# Patient Record
Sex: FEMALE | Race: WHITE | Employment: UNEMPLOYED
[De-identification: names, ages, dates, MRNs, and addresses within clinical notes are randomized per-mention and may not be internally consistent; named-entity substitution may affect disease eponyms.]

---

## 2023-10-17 ENCOUNTER — HOSPITAL ENCOUNTER (OUTPATIENT)
Facility: HOSPITAL | Age: 23
Setting detail: SPECIMEN
Discharge: HOME | End: 2023-10-20
Payer: OTHER GOVERNMENT

## 2023-10-17 ENCOUNTER — APPOINTMENT (OUTPATIENT)
Age: 23
End: 2023-10-17
Payer: OTHER GOVERNMENT

## 2023-10-17 DIAGNOSIS — R79.89 ELEVATED TSH: ICD-10-CM

## 2023-10-17 LAB
T4 FREE SERPL-MCNC: 1.1 NG/DL (ref 0.7–1.5)
TSH SERPL DL<=0.05 MIU/L-ACNC: 4.13 UIU/ML (ref 0.36–3.74)

## 2023-10-17 PROCEDURE — 84480 ASSAY TRIIODOTHYRONINE (T3): CPT

## 2023-10-17 PROCEDURE — 36415 COLL VENOUS BLD VENIPUNCTURE: CPT

## 2023-10-17 PROCEDURE — 86800 THYROGLOBULIN ANTIBODY: CPT

## 2023-10-17 PROCEDURE — 84439 ASSAY OF FREE THYROXINE: CPT

## 2023-10-17 PROCEDURE — 86376 MICROSOMAL ANTIBODY EACH: CPT

## 2023-10-17 PROCEDURE — 84443 ASSAY THYROID STIM HORMONE: CPT

## 2023-10-20 LAB
T3 SERPL-MCNC: 151 NG/DL (ref 71–180)
THYROGLOB AB SERPL-ACNC: <1 IU/ML (ref 0–0.9)
THYROPEROXIDASE AB SERPL-ACNC: <9 IU/ML (ref 0–34)

## 2024-01-22 ENCOUNTER — NURSE ONLY (OUTPATIENT)
Age: 24
End: 2024-01-22
Payer: OTHER GOVERNMENT

## 2024-01-22 PROCEDURE — 90739 HEPB VACC 2/4 DOSE ADULT IM: CPT | Performed by: NURSE PRACTITIONER

## 2024-01-22 PROCEDURE — 90651 9VHPV VACCINE 2/3 DOSE IM: CPT | Performed by: NURSE PRACTITIONER

## 2024-01-22 PROCEDURE — 90716 VAR VACCINE LIVE SUBQ: CPT | Performed by: NURSE PRACTITIONER

## 2024-01-22 PROCEDURE — PBSHW HEP B, HEPLISAV-B, (AGE 18 YRS+), IM, 0.5ML, 2-DOSE: Performed by: NURSE PRACTITIONER

## 2024-01-22 PROCEDURE — PBSHW VARICELLA, VARIVAX, (AGE 12 MO+), SC: Performed by: NURSE PRACTITIONER

## 2024-01-22 PROCEDURE — PBSHW HPV, GARDASIL 9, (AGE 9-45 YRS), IM: Performed by: NURSE PRACTITIONER

## 2024-01-22 NOTE — PROGRESS NOTES
Niurka Padgett denies any symptoms , reactions or allergies that would exclude them from being immunized today. Risks and adverse reactions were discussed and the most current VIS was given to them along with consent signed. All questions were addressed. Patient received Gardasil 9, Heplisav, and Varicella. Tolerated well. No signs or symptoms of distress noted. Patient left office ambulatory.

## 2024-02-06 ENCOUNTER — HOSPITAL ENCOUNTER (OUTPATIENT)
Facility: HOSPITAL | Age: 24
Setting detail: SPECIMEN
Discharge: HOME | End: 2024-02-09
Payer: OTHER GOVERNMENT

## 2024-02-06 ENCOUNTER — OFFICE VISIT (OUTPATIENT)
Age: 24
End: 2024-02-06
Payer: OTHER GOVERNMENT

## 2024-02-06 VITALS
DIASTOLIC BLOOD PRESSURE: 65 MMHG | BODY MASS INDEX: 44.41 KG/M2 | SYSTOLIC BLOOD PRESSURE: 112 MMHG | HEIGHT: 68 IN | HEART RATE: 92 BPM | TEMPERATURE: 98.2 F | WEIGHT: 293 LBS | OXYGEN SATURATION: 100 % | RESPIRATION RATE: 20 BRPM

## 2024-02-06 DIAGNOSIS — Z30.09 FAMILY PLANNING: ICD-10-CM

## 2024-02-06 DIAGNOSIS — Z00.00 ANNUAL PHYSICAL EXAM: ICD-10-CM

## 2024-02-06 DIAGNOSIS — Z00.00 ENCOUNTER FOR WELL ADULT EXAM WITHOUT ABNORMAL FINDINGS: ICD-10-CM

## 2024-02-06 DIAGNOSIS — L81.9 ATYPICAL PIGMENTED LESION: ICD-10-CM

## 2024-02-06 DIAGNOSIS — Z00.00 ANNUAL PHYSICAL EXAM: Primary | ICD-10-CM

## 2024-02-06 DIAGNOSIS — Z87.42 HISTORY OF PCOS: ICD-10-CM

## 2024-02-06 DIAGNOSIS — Z11.3 SCREENING EXAMINATION FOR STD (SEXUALLY TRANSMITTED DISEASE): ICD-10-CM

## 2024-02-06 DIAGNOSIS — Z23 IMMUNIZATION DUE: ICD-10-CM

## 2024-02-06 DIAGNOSIS — R21 SCALY PATCH RASH: ICD-10-CM

## 2024-02-06 DIAGNOSIS — Z12.4 CERVICAL CANCER SCREENING: ICD-10-CM

## 2024-02-06 LAB
ALBUMIN SERPL-MCNC: 3.9 G/DL (ref 3.4–5)
ALBUMIN/GLOB SERPL: 1 (ref 0.8–1.7)
ALP SERPL-CCNC: 67 U/L (ref 45–117)
ALT SERPL-CCNC: 29 U/L (ref 13–56)
ANION GAP SERPL CALC-SCNC: 3 MMOL/L (ref 3–18)
APPEARANCE UR: CLEAR
AST SERPL-CCNC: 19 U/L (ref 10–38)
BACTERIA URNS QL MICRO: ABNORMAL /HPF
BASOPHILS # BLD: 0 K/UL (ref 0–0.1)
BASOPHILS NFR BLD: 0 % (ref 0–2)
BILIRUB SERPL-MCNC: 0.4 MG/DL (ref 0.2–1)
BILIRUB UR QL: NEGATIVE
BUN SERPL-MCNC: 13 MG/DL (ref 7–18)
BUN/CREAT SERPL: 19 (ref 12–20)
CALCIUM SERPL-MCNC: 9.6 MG/DL (ref 8.5–10.1)
CHLORIDE SERPL-SCNC: 106 MMOL/L (ref 100–111)
CHOLEST SERPL-MCNC: 128 MG/DL
CO2 SERPL-SCNC: 29 MMOL/L (ref 21–32)
COLOR UR: YELLOW
CREAT SERPL-MCNC: 0.7 MG/DL (ref 0.6–1.3)
DIFFERENTIAL METHOD BLD: ABNORMAL
EOSINOPHIL # BLD: 0 K/UL (ref 0–0.4)
EOSINOPHIL NFR BLD: 1 % (ref 0–5)
EPITH CASTS URNS QL MICRO: ABNORMAL /LPF (ref 0–5)
ERYTHROCYTE [DISTWIDTH] IN BLOOD BY AUTOMATED COUNT: 14.8 % (ref 11.6–14.5)
EST. AVERAGE GLUCOSE BLD GHB EST-MCNC: 114 MG/DL
GLOBULIN SER CALC-MCNC: 3.8 G/DL (ref 2–4)
GLUCOSE SERPL-MCNC: 92 MG/DL (ref 74–99)
GLUCOSE UR STRIP.AUTO-MCNC: NEGATIVE MG/DL
HBA1C MFR BLD: 5.6 % (ref 4.2–5.6)
HCT VFR BLD AUTO: 38.3 % (ref 35–45)
HDLC SERPL-MCNC: 41 MG/DL (ref 40–60)
HDLC SERPL: 3.1 (ref 0–5)
HGB BLD-MCNC: 11.8 G/DL (ref 12–16)
HGB UR QL STRIP: NEGATIVE
IMM GRANULOCYTES # BLD AUTO: 0 K/UL (ref 0–0.04)
IMM GRANULOCYTES NFR BLD AUTO: 0 % (ref 0–0.5)
KETONES UR QL STRIP.AUTO: NEGATIVE MG/DL
LDLC SERPL CALC-MCNC: 70 MG/DL (ref 0–100)
LEUKOCYTE ESTERASE UR QL STRIP.AUTO: NEGATIVE
LIPID PANEL: NORMAL
LYMPHOCYTES # BLD: 2.3 K/UL (ref 0.9–3.6)
LYMPHOCYTES NFR BLD: 28 % (ref 21–52)
MCH RBC QN AUTO: 22.4 PG (ref 24–34)
MCHC RBC AUTO-ENTMCNC: 30.8 G/DL (ref 31–37)
MCV RBC AUTO: 72.8 FL (ref 78–100)
MONOCYTES # BLD: 0.6 K/UL (ref 0.05–1.2)
MONOCYTES NFR BLD: 7 % (ref 3–10)
NEUTS SEG # BLD: 5.2 K/UL (ref 1.8–8)
NEUTS SEG NFR BLD: 64 % (ref 40–73)
NITRITE UR QL STRIP.AUTO: NEGATIVE
NRBC # BLD: 0 K/UL (ref 0–0.01)
NRBC BLD-RTO: 0 PER 100 WBC
PH UR STRIP: 7.5 (ref 5–8)
PLATELET # BLD AUTO: 423 K/UL (ref 135–420)
PMV BLD AUTO: 9 FL (ref 9.2–11.8)
POTASSIUM SERPL-SCNC: 4 MMOL/L (ref 3.5–5.5)
PROT SERPL-MCNC: 7.7 G/DL (ref 6.4–8.2)
PROT UR STRIP-MCNC: NEGATIVE MG/DL
RBC # BLD AUTO: 5.26 M/UL (ref 4.2–5.3)
RBC #/AREA URNS HPF: NEGATIVE /HPF (ref 0–5)
SODIUM SERPL-SCNC: 138 MMOL/L (ref 136–145)
SP GR UR REFRACTOMETRY: 1.03 (ref 1–1.03)
TRIGL SERPL-MCNC: 85 MG/DL
TSH SERPL DL<=0.05 MIU/L-ACNC: 2.66 UIU/ML (ref 0.36–3.74)
UROBILINOGEN UR QL STRIP.AUTO: 1 EU/DL (ref 0.2–1)
VLDLC SERPL CALC-MCNC: 17 MG/DL
WBC # BLD AUTO: 8.2 K/UL (ref 4.6–13.2)
WBC URNS QL MICRO: NEGATIVE /HPF (ref 0–4)

## 2024-02-06 PROCEDURE — 87389 HIV-1 AG W/HIV-1&-2 AB AG IA: CPT

## 2024-02-06 PROCEDURE — 86803 HEPATITIS C AB TEST: CPT

## 2024-02-06 PROCEDURE — 87491 CHLMYD TRACH DNA AMP PROBE: CPT

## 2024-02-06 PROCEDURE — 90715 TDAP VACCINE 7 YRS/> IM: CPT | Performed by: NURSE PRACTITIONER

## 2024-02-06 PROCEDURE — 86780 TREPONEMA PALLIDUM: CPT

## 2024-02-06 PROCEDURE — 85025 COMPLETE CBC W/AUTO DIFF WBC: CPT

## 2024-02-06 PROCEDURE — 87591 N.GONORRHOEAE DNA AMP PROB: CPT

## 2024-02-06 PROCEDURE — 80053 COMPREHEN METABOLIC PANEL: CPT

## 2024-02-06 PROCEDURE — 83036 HEMOGLOBIN GLYCOSYLATED A1C: CPT

## 2024-02-06 PROCEDURE — 87661 TRICHOMONAS VAGINALIS AMPLIF: CPT

## 2024-02-06 PROCEDURE — 80061 LIPID PANEL: CPT

## 2024-02-06 PROCEDURE — 81001 URINALYSIS AUTO W/SCOPE: CPT

## 2024-02-06 PROCEDURE — 84443 ASSAY THYROID STIM HORMONE: CPT

## 2024-02-06 PROCEDURE — 99395 PREV VISIT EST AGE 18-39: CPT | Performed by: NURSE PRACTITIONER

## 2024-02-06 PROCEDURE — 36415 COLL VENOUS BLD VENIPUNCTURE: CPT

## 2024-02-06 PROCEDURE — 87350 HEPATITIS BE AG IA: CPT

## 2024-02-06 ASSESSMENT — PATIENT HEALTH QUESTIONNAIRE - PHQ9
SUM OF ALL RESPONSES TO PHQ QUESTIONS 1-9: 0
SUM OF ALL RESPONSES TO PHQ QUESTIONS 1-9: 0
SUM OF ALL RESPONSES TO PHQ9 QUESTIONS 1 & 2: 0
1. LITTLE INTEREST OR PLEASURE IN DOING THINGS: 0
SUM OF ALL RESPONSES TO PHQ QUESTIONS 1-9: 0
SUM OF ALL RESPONSES TO PHQ QUESTIONS 1-9: 0
2. FEELING DOWN, DEPRESSED OR HOPELESS: 0

## 2024-02-06 ASSESSMENT — ENCOUNTER SYMPTOMS
NAUSEA: 0
CHEST TIGHTNESS: 0
SHORTNESS OF BREATH: 0
ABDOMINAL PAIN: 0
COUGH: 0
VOMITING: 0

## 2024-02-06 NOTE — PROGRESS NOTES
1. \"Have you been to the ER, urgent care clinic since your last visit?  Hospitalized since your last visit?\" No    2. \"Have you seen or consulted any other health care providers outside of the [facility] since your last visit?\" No     If the patient is female:    3. For patients aged 21-65: Has the patient had a pap smear? Yes - no Care Gap present- 2023- normal results.

## 2024-02-06 NOTE — PROGRESS NOTES
Niurka Padgett (:  2000) is a 23 y.o. female, here for evaluation of the following medical concerns:  No chief complaint on file.        ASSESSMENT/PLAN:    {There are no diagnoses linked to this encounter. (Refresh or delete this SmartLink)}   No follow-ups on file.        HPI    Review of Systems    Prior to Visit Medications    Medication Sig Taking? Authorizing Provider   metFORMIN (GLUCOPHAGE) 500 MG tablet Take 1 tablet by mouth 2 times daily  Jose Luis Sinclair MD   Ferrous Sulfate (IRON PO) Take 1 tablet by mouth daily  Joes Luis Sinclair MD   OMEPRAZOLE PO Take 1 tablet by mouth daily  Jose Luis Sinclair MD        Social History     Tobacco Use    Smoking status: Former     Current packs/day: 0.25     Average packs/day: 0.3 packs/day for 2.0 years (0.5 ttl pk-yrs)     Types: Cigarettes, Cigars     Passive exposure: Never    Smokeless tobacco: Never   Substance Use Topics    Alcohol use: Never        There were no vitals taken for this visit.  Estimated body mass index is 44.38 kg/m² as calculated from the following:    Height as of 10/9/23: 1.715 m (5' 7.5\").    Weight as of 10/9/23: 130.5 kg (287 lb 9.6 oz).  Past Medical History:   Diagnosis Date    History of PCOS     Obesity      Past Surgical History:   Procedure Laterality Date    COSMETIC SURGERY      Breast reduction for back pain    UPPER GASTROINTESTINAL ENDOSCOPY      WISDOM TOOTH EXTRACTION  2023     Family History   Problem Relation Age of Onset    Depression Mother     High Blood Pressure Mother     Mental Illness Mother     Miscarriages / Stillbirths Mother     Obesity Mother     Substance Abuse Mother     Alcohol Abuse Father     Substance Abuse Maternal Grandfather     Asthma Maternal Grandmother     High Blood Pressure Maternal Grandmother     Psoriasis Maternal Grandmother     Substance Abuse Maternal Grandmother     Alcohol Abuse Paternal Grandfather     Obesity Sister     Substance Abuse Maternal Uncle

## 2024-02-06 NOTE — PROGRESS NOTES
Well Adult Note  Name: Niurka Padgett Today’s Date: 2024   MRN: [id number] Sex: Female   Age: 23 y.o. Ethnicity: Unavailable / Unknown   : 2000 Race: Black /   White (non-)      Niurka Padgett is here for well adult exam. Reports a \"spot\" left leg raised, no drainage, swelling,  pain-- ref to derm, brown/black annular lesion,       History:  She reports with hx of insomnia and noted positive depression screening, pcos with morbid obesity. Ref to psych and gyn, she is with irregular periods, last period was in .      PCOS- taking Metformin 500 mg with Dr. Wlison in [place]- has not been refilled so has not been taking. She has cut out red meats for reasons of inflammation states lighter and less sluggish, she is using carbonated grimaldo, has ice cream every now and then, eating pasta once in a while.      Acid reflux- omeprazole, had a GI provider in the past with bleeding ulcer. Was placed on iron supplements as well. States hx of iron def told with donating blood.      PHQ9= 19/27, reports hx of SI attempt age 16, was admitted. Mother with hx of drug addition, large family hx of drug addiction, she raised her sister so she is worrying because she had to leave her sister with the family.   -- she reports in a better place, talked with mother and reports putting things in the past    Review of Systems   Constitutional:  Negative for diaphoresis and fatigue.   Respiratory:  Negative for cough, chest tightness and shortness of breath.    Cardiovascular:  Negative for chest pain, palpitations and leg swelling.   Gastrointestinal:  Negative for abdominal pain, nausea and vomiting.   Neurological:  Negative for dizziness, weakness and headaches.       Allergies   Allergen Reactions    Acetaminophen Itching and Headaches         Prior to Visit Medications    Medication Sig Taking? Authorizing Provider   metFORMIN (GLUCOPHAGE) 500 MG tablet Take 1 tablet by mouth 2 times daily  Provider,  Referral To Dermatology  7. Atypical pigmented lesion  -     External Referral To Dermatology  8. History of PCOS  -     External Referral To Gynecology  9. Family planning  -     External Referral To Gynecology         Personalized Preventive Plan   Current Health Maintenance Status  Immunization History   Administered Date(s) Administered    HPV, GARDASIL 9, (age 9y-45y), IM, 0.5mL 01/22/2024    Hep B, HEPLISAV-B, (age 18y+), IM, 0.5mL 01/22/2024    Varicella, VARIVAX, (age 12m+), [place], 0.5mL 01/22/2024        Health Maintenance   Topic Date Due    COVID-19 Vaccine (1) Never done    Chlamydia/GC screen  Never done    Hepatitis C screen  Never done    DTaP/Tdap/Td vaccine (1 - Tdap) Never done    Pap smear  Never done    Flu vaccine (1) Never done    Hepatitis B vaccine (2 of 2 - CpG 2-dose series) 02/19/2024    HPV vaccine (2 - 3-dose series) 02/19/2024    Varicella vaccine (2 of 2 - 13+ 2-dose series) 02/19/2024    Depression Monitoring  10/09/2024    HIV screen  Completed    Hepatitis A vaccine  Aged Out    Hib vaccine  Aged Out    Polio vaccine  Aged Out    Meningococcal (ACWY) vaccine  Aged Out    Pneumococcal 0-64 years Vaccine  Aged Out    Depression Screen  Discontinued     Recommendations for Preventive Services Due: see orders and patient instructions/AVS.    Return in 1 year (on 2/6/2025).

## 2024-02-06 NOTE — PATIENT INSTRUCTIONS
energy. They keep your heart beating, your brain active, and your muscles working.  On most days, try to eat from each food group. This means eating a variety of:  Whole grains, such as whole wheat breads and pastas.  Fruits and vegetables.  Dairy products, such as low-fat milk, yogurt, and cheese.  Lean proteins, such as all types of fish, chicken without the skin, and beans.  Don't have too much or too little of one thing. All foods, if eaten in moderation, can be part of healthy eating. Even sweets can be okay.  If your favorite foods are high in fat, salt, sugar, or calories, limit how often you eat them. Eat smaller servings, or look for healthy substitutes.  Do watch what you eat  Many people eat more than their bodies need. Part of staying at a healthy weight means learning how much food you really need from day to day and not eating more than that. Even with healthy foods, eating too much can make you gain weight.  Having a well-balanced diet means that you eat enough, but not too much, and that your food gives you the nutrients you need to stay healthy. So listen to your body. Eat when you're hungry. Stop when you feel satisfied.  It's a good idea to have healthy snacks ready for when you get hungry. Keep healthy snacks with you at work, in your car, and at home. If you have a healthy snack easily available, you'll be less likely to pick a candy bar or bag of chips from a vending machine instead.  Some healthy snacks you might want to keep on hand are fruit, low-fat yogurt, string cheese, low-fat microwave popcorn, raisins and other dried fruit, nuts, whole wheat crackers, pretzels, carrots, celery sticks, and broccoli.  Do some physical activity  A big part of reaching and staying at a healthy weight is being active.  When you're active, you burn calories. This makes it easier to reach and stay at a healthy weight. When you're active on a regular basis, your body burns more calories, even when you're at  sugar   Take vitamin supplements, as recommended  It can take several months to learn to listen to your body so that you know when you are hungry and when you are full. You may dislike foods you previously loved, and you may begin to prefer new foods. This can be a frustrating process for some people, so talk to your dietitian if you are having trouble.  It usually takes between one and two years to lose weight after surgery. After reaching their goal weight, some people have plastic surgery (called \"body contouring\") to remove excess skin from the body, particularly in the abdominal area.  Before you decide to have weight loss surgery, you must commit to staying healthy for life. This includes following up with your healthcare team, exercising most days of the week, and eating a sensible diet every day. It can be difficult to develop new eating and exercise habits after weight loss surgery, and you will have to work hard to stick to your goals.  Recovering from surgery and losing weight can be stressful and emotional, and it is important to have the support of family and friends. Working with a , therapist, or support group can help you through the ups and downs.  WHERE TO GET MORE INFORMATION -- Your healthcare provider is the best source of information for questions and concerns related to your medical problem.  This article will be updated as needed every four months on our Web site ([email or web address])    High-Fiber Diet     What Is Fiber?   Dietary fiber is a form of carbohydrate found in plants that cannot be digested by humans. All plants contain fiber, including fruits, vegetables, grains, and legumes. Fiber is often classified into two categories: soluble and insoluble.   Soluble fiber draws water into the bowel and can help slow digestion. Examples of foods that are high in soluble fiber include oatmeal, oat bran, barley, legumes (eg, beans and peas), apples, and strawberries.

## 2024-02-07 LAB
HCV AB SER IA-ACNC: 0.06 INDEX
HCV AB SERPL QL IA: NEGATIVE
HEPATITIS C COMMENT: NORMAL
HIV 1+2 AB+HIV1 P24 AG SERPL QL IA: NONREACTIVE
HIV 1/2 RESULT COMMENT: NORMAL
T PALLIDUM AB SER QL IA: NONREACTIVE

## 2024-02-07 PROCEDURE — PBSHW TDAP, BOOSTRIX, (AGE 10 YRS+), IM: Performed by: NURSE PRACTITIONER

## 2024-02-07 PROCEDURE — 90471 IMMUNIZATION ADMIN: CPT | Performed by: NURSE PRACTITIONER

## 2024-02-08 LAB
C TRACH RRNA SPEC QL NAA+PROBE: NEGATIVE
N GONORRHOEA RRNA SPEC QL NAA+PROBE: NEGATIVE
SPECIMEN SOURCE: NORMAL
T VAGINALIS RRNA SPEC QL NAA+PROBE: NEGATIVE

## 2024-02-14 LAB — HBV E AG SERPL QL IA: NEGATIVE

## 2024-02-23 ENCOUNTER — NURSE ONLY (OUTPATIENT)
Age: 24
End: 2024-02-23
Payer: OTHER GOVERNMENT

## 2024-02-23 PROCEDURE — PBSHW HEP B, HEPLISAV-B, (AGE 18 YRS+), IM, 0.5ML, 2-DOSE: Performed by: NURSE PRACTITIONER

## 2024-02-23 PROCEDURE — 90739 HEPB VACC 2/4 DOSE ADULT IM: CPT | Performed by: NURSE PRACTITIONER

## 2024-02-23 NOTE — PROGRESS NOTES
Patient is here today for #2 hep b vaccine. Patient was given the consent form to fill out. Medications and allergies were reviewed, and patient was asked if she/he is on any steroids or antibiotics. Patient stated that she/he is not feeling sick and denied steroids/antibiotics today. Consent was singed and vaccine was given. Patient was instructed on when to go to the emergency room and what is normal after giving vaccines. Patient verbalized a understanding.

## 2024-02-28 ENCOUNTER — NURSE ONLY (OUTPATIENT)
Age: 24
End: 2024-02-28
Payer: OTHER GOVERNMENT

## 2024-02-28 DIAGNOSIS — Z23 IMMUNIZATION DUE: Primary | ICD-10-CM

## 2024-02-28 PROCEDURE — 90471 IMMUNIZATION ADMIN: CPT | Performed by: NURSE PRACTITIONER

## 2024-02-28 PROCEDURE — PBSHW HPV, GARDASIL 9, (AGE 9-45 YRS), IM: Performed by: NURSE PRACTITIONER

## 2024-02-28 NOTE — PROGRESS NOTES
Patient is here today for #2 hpv vaccine. Patient was given the consent form to fill out. Medications and allergies were reviewed, and patient was asked if she is on any steroids or antibiotics. Patient stated that she is not feeling sick and denied steroids/antibiotics today. Consent was singed and vaccine was given. Patient was instructed on when to go to the emergency room and what is normal after giving vaccines. Patient verbalized a understanding.

## 2024-07-10 ENCOUNTER — OFFICE VISIT (OUTPATIENT)
Facility: CLINIC | Age: 24
End: 2024-07-10
Payer: OTHER GOVERNMENT

## 2024-07-10 ENCOUNTER — HOSPITAL ENCOUNTER (OUTPATIENT)
Facility: HOSPITAL | Age: 24
Setting detail: SPECIMEN
Discharge: HOME | End: 2024-07-13
Payer: OTHER GOVERNMENT

## 2024-07-10 VITALS
TEMPERATURE: 98.1 F | DIASTOLIC BLOOD PRESSURE: 83 MMHG | SYSTOLIC BLOOD PRESSURE: 118 MMHG | HEIGHT: 67 IN | OXYGEN SATURATION: 97 % | BODY MASS INDEX: 45.71 KG/M2 | RESPIRATION RATE: 20 BRPM | WEIGHT: 291.2 LBS | HEART RATE: 92 BPM

## 2024-07-10 DIAGNOSIS — K13.70 MOUTH LESION: Primary | ICD-10-CM

## 2024-07-10 DIAGNOSIS — Z86.39 HISTORY OF IRON DEFICIENCY: ICD-10-CM

## 2024-07-10 DIAGNOSIS — K13.70 MOUTH LESION: ICD-10-CM

## 2024-07-10 LAB
BASOPHILS # BLD: 0 K/UL (ref 0–0.1)
BASOPHILS NFR BLD: 0 % (ref 0–2)
DIFFERENTIAL METHOD BLD: ABNORMAL
EOSINOPHIL # BLD: 0.1 K/UL (ref 0–0.4)
EOSINOPHIL NFR BLD: 2 % (ref 0–5)
ERYTHROCYTE [DISTWIDTH] IN BLOOD BY AUTOMATED COUNT: 15.5 % (ref 11.6–14.5)
FERRITIN SERPL-MCNC: 25 NG/ML (ref 8–388)
HCT VFR BLD AUTO: 34 % (ref 35–45)
HGB BLD-MCNC: 10.6 G/DL (ref 12–16)
IMM GRANULOCYTES # BLD AUTO: 0 K/UL (ref 0–0.04)
IMM GRANULOCYTES NFR BLD AUTO: 0 % (ref 0–0.5)
LYMPHOCYTES # BLD: 2.1 K/UL (ref 0.9–3.6)
LYMPHOCYTES NFR BLD: 29 % (ref 21–52)
MCH RBC QN AUTO: 22.6 PG (ref 24–34)
MCHC RBC AUTO-ENTMCNC: 31.2 G/DL (ref 31–37)
MCV RBC AUTO: 72.6 FL (ref 78–100)
MONOCYTES # BLD: 0.5 K/UL (ref 0.05–1.2)
MONOCYTES NFR BLD: 8 % (ref 3–10)
NEUTS SEG # BLD: 4.3 K/UL (ref 1.8–8)
NEUTS SEG NFR BLD: 61 % (ref 40–73)
NRBC # BLD: 0 K/UL (ref 0–0.01)
NRBC BLD-RTO: 0 PER 100 WBC
PLATELET # BLD AUTO: 436 K/UL (ref 135–420)
PMV BLD AUTO: 8.5 FL (ref 9.2–11.8)
RBC # BLD AUTO: 4.68 M/UL (ref 4.2–5.3)
WBC # BLD AUTO: 7.1 K/UL (ref 4.6–13.2)

## 2024-07-10 PROCEDURE — 85025 COMPLETE CBC W/AUTO DIFF WBC: CPT

## 2024-07-10 PROCEDURE — 86695 HERPES SIMPLEX TYPE 1 TEST: CPT

## 2024-07-10 PROCEDURE — 82728 ASSAY OF FERRITIN: CPT

## 2024-07-10 PROCEDURE — 99213 OFFICE O/P EST LOW 20 MIN: CPT | Performed by: NURSE PRACTITIONER

## 2024-07-10 PROCEDURE — 86696 HERPES SIMPLEX TYPE 2 TEST: CPT

## 2024-07-10 PROCEDURE — 36415 COLL VENOUS BLD VENIPUNCTURE: CPT

## 2024-07-10 RX ORDER — MEDROXYPROGESTERONE ACETATE 10 MG/1
TABLET ORAL
COMMUNITY
Start: 2024-05-23

## 2024-07-10 RX ORDER — LETROZOLE 2.5 MG/1
TABLET, FILM COATED ORAL
COMMUNITY
Start: 2024-05-23

## 2024-07-10 RX ORDER — CEPHALEXIN 500 MG/1
CAPSULE ORAL
COMMUNITY
Start: 2024-06-29

## 2024-07-10 NOTE — PROGRESS NOTES
1. \"Have you been to the ER, urgent care clinic since your last visit?  Hospitalized since your last visit?\" Yes When: 06/29 Where: [facility] Reason for visit: dysuria     2. \"Have you seen or consulted any other health care providers outside of the [facility] since your last visit?\" Yes, padmini leong      If the patient is female:     3. For patients aged 21-65: Has the patient had a pap smear? Yes - no Care Gap present- April/May2024- normal results.    
Alternatives have been explained and offered.  The risks, benefits and significant side effects of all medications have been reviewed. Anticipated time course and progression of condition reviewed. All questions have been addressed.  She is encouraged to employ the information provided in the after visit summary, which was reviewed.      Where applicable, she is instructed to call the clinic if she has not been notified either by phone or through MyChart with the results of her tests/labs or with an appointment plan for any referrals within 1 week(s). The patient is to call if her condition worsens or fails to improve or if significant side effects are experienced.     Please note that this dictation was completed with [employer], the [employer] voice recognition software. Quite often unanticipated grammatical, syntax, homophones, and other interpretive errors are inadvertently transcribed by the computer software. Please disregard these errors. Please excuse any errors that have escaped final proofreading.    An electronic signature was used to authenticate this note.    On this date 7/10/2024 I have spent 13 minutes reviewing previous notes, test results and face to face with the patient discussing the diagnosis and importance of compliance with the treatment plan as well as documenting on the day of the visit.      --ALE Busch - NP on 7/18/2024 at 2:10 PM

## 2024-07-13 LAB
HSV1 IGG SER IA-ACNC: <0.91 INDEX (ref 0–0.9)
HSV2 IGG SER IA-ACNC: <0.91 INDEX (ref 0–0.9)

## 2024-09-04 ENCOUNTER — OFFICE VISIT (OUTPATIENT)
Facility: CLINIC | Age: 24
End: 2024-09-04
Payer: OTHER GOVERNMENT

## 2024-09-04 VITALS
BODY MASS INDEX: 45.33 KG/M2 | TEMPERATURE: 99 F | WEIGHT: 288.8 LBS | HEART RATE: 106 BPM | RESPIRATION RATE: 20 BRPM | DIASTOLIC BLOOD PRESSURE: 82 MMHG | SYSTOLIC BLOOD PRESSURE: 113 MMHG | OXYGEN SATURATION: 97 % | HEIGHT: 67 IN

## 2024-09-04 DIAGNOSIS — R11.0 NAUSEA: ICD-10-CM

## 2024-09-04 DIAGNOSIS — R68.83 CHILLS: ICD-10-CM

## 2024-09-04 DIAGNOSIS — J02.9 SORE THROAT: Primary | ICD-10-CM

## 2024-09-04 DIAGNOSIS — R50.9 LOW GRADE FEVER: ICD-10-CM

## 2024-09-04 DIAGNOSIS — R05.1 ACUTE COUGH: ICD-10-CM

## 2024-09-04 DIAGNOSIS — J06.9 VIRAL URI: ICD-10-CM

## 2024-09-04 LAB
EXP DATE SOLUTION: NORMAL
EXP DATE SWAB: NORMAL
EXPIRATION DATE: NORMAL
INFLUENZA A ANTIGEN, POC: NEGATIVE
INFLUENZA B ANTIGEN, POC: NEGATIVE
LOT NUMBER POC: NORMAL
LOT NUMBER SOLUTION: NORMAL
LOT NUMBER SWAB: NORMAL
SARS-COV-2 RNA, POC: NEGATIVE
VALID INTERNAL CONTROL, POC: YES

## 2024-09-04 PROCEDURE — 87635 SARS-COV-2 COVID-19 AMP PRB: CPT | Performed by: NURSE PRACTITIONER

## 2024-09-04 PROCEDURE — 99213 OFFICE O/P EST LOW 20 MIN: CPT | Performed by: NURSE PRACTITIONER

## 2024-09-04 PROCEDURE — 87502 INFLUENZA DNA AMP PROBE: CPT | Performed by: NURSE PRACTITIONER

## 2024-09-04 ASSESSMENT — ENCOUNTER SYMPTOMS
SORE THROAT: 1
COUGH: 1

## 2024-09-04 NOTE — PROGRESS NOTES
1. \"Have you been to the ER, urgent care clinic since your last visit?  Hospitalized since your last visit?\" Yes When: 08/31 [facility]: [facility] Reason for visit: fall    2. \"Have you seen or consulted any other health care providers outside of the [facility] since your last visit?\" Yes, padmini leong      If the patient is female:     3. For patients aged 21-65: Has the patient had a pap smear? Yes - no Care Gap present- April/May2024- normal results  
   LABGLOM >60 02/06/2024    GLOB 3.8 02/06/2024     Lab Results   Component Value Date    TSH 2.66 02/06/2024     Hemoglobin A1C   Date Value Ref Range Status   02/06/2024 5.6 4.2 - 5.6 % Final     Comment:     (NOTE)  HbA1C Interpretive Ranges  <5.7              Normal  5.7 - 6.4         Consider Prediabetes  >6.5              Consider Diabetes       Lab Results   Component Value Date    CHOL 128 02/06/2024     Lab Results   Component Value Date    TRIG 85 02/06/2024     Lab Results   Component Value Date    HDL 41 02/06/2024     No components found for: \"LDLCHOLESTEROL\", \"LDLCALC\"  Lab Results   Component Value Date    VLDL 17 02/06/2024     Lab Results   Component Value Date    CHOLHDLRATIO 3.1 02/06/2024         Physical Exam  Vitals reviewed.   Constitutional:       Appearance: Normal appearance. She is obese.   HENT:      Head: Normocephalic and atraumatic.      Right Ear: A middle ear effusion is present.      Left Ear: A middle ear effusion is present.      Nose: Nose normal.      Right Turbinates: Enlarged.      Left Turbinates: Enlarged.      Mouth/Throat:      Mouth: Mucous membranes are moist.      Pharynx: Oropharynx is clear. Posterior oropharyngeal erythema and postnasal drip present. No pharyngeal swelling, oropharyngeal exudate or uvula swelling.      Tonsils: No tonsillar exudate or tonsillar abscesses.      Comments: Enlarged tonsils bilaterally  Eyes:      Extraocular Movements: Extraocular movements intact.      Conjunctiva/sclera: Conjunctivae normal.      Pupils: Pupils are equal, round, and reactive to light.   Pulmonary:      Effort: Pulmonary effort is normal.   Abdominal:      General: Bowel sounds are normal.      Palpations: Abdomen is soft.   Musculoskeletal:         General: Normal range of motion.      Cervical back: Normal range of motion and neck supple.   Skin:     General: Skin is warm and dry.   Neurological:      Mental Status: She is alert.         Disclaimer:    The patient

## 2024-09-06 ENCOUNTER — TELEPHONE (OUTPATIENT)
Facility: CLINIC | Age: 24
End: 2024-09-06

## 2024-11-20 ENCOUNTER — HOSPITAL ENCOUNTER (OUTPATIENT)
Facility: HOSPITAL | Age: 24
Setting detail: SPECIMEN
Discharge: HOME | End: 2024-11-23
Payer: OTHER GOVERNMENT

## 2024-11-20 ENCOUNTER — OFFICE VISIT (OUTPATIENT)
Facility: CLINIC | Age: 24
End: 2024-11-20
Payer: OTHER GOVERNMENT

## 2024-11-20 VITALS
HEART RATE: 96 BPM | BODY MASS INDEX: 44.1 KG/M2 | DIASTOLIC BLOOD PRESSURE: 73 MMHG | SYSTOLIC BLOOD PRESSURE: 124 MMHG | OXYGEN SATURATION: 98 % | WEIGHT: 281 LBS | HEIGHT: 67 IN | TEMPERATURE: 98.1 F

## 2024-11-20 DIAGNOSIS — T63.301A SPIDER BITE WOUND, ACCIDENTAL OR UNINTENTIONAL, INITIAL ENCOUNTER: Primary | ICD-10-CM

## 2024-11-20 DIAGNOSIS — F32.A DEPRESSION, UNSPECIFIED DEPRESSION TYPE: ICD-10-CM

## 2024-11-20 DIAGNOSIS — T63.301A SPIDER BITE WOUND, ACCIDENTAL OR UNINTENTIONAL, INITIAL ENCOUNTER: ICD-10-CM

## 2024-11-20 PROCEDURE — 87070 CULTURE OTHR SPECIMN AEROBIC: CPT

## 2024-11-20 PROCEDURE — 87075 CULTR BACTERIA EXCEPT BLOOD: CPT

## 2024-11-20 PROCEDURE — 99213 OFFICE O/P EST LOW 20 MIN: CPT | Performed by: NURSE PRACTITIONER

## 2024-11-20 PROCEDURE — 87205 SMEAR GRAM STAIN: CPT

## 2024-11-20 RX ORDER — CEPHALEXIN 500 MG/1
500 CAPSULE ORAL 2 TIMES DAILY
Qty: 14 CAPSULE | Refills: 0 | Status: SHIPPED | OUTPATIENT
Start: 2024-11-20 | End: 2024-11-27

## 2024-11-20 RX ORDER — IBUPROFEN 600 MG/1
600 TABLET, FILM COATED ORAL EVERY 6 HOURS PRN
COMMUNITY
Start: 2024-09-04

## 2024-11-20 SDOH — ECONOMIC STABILITY: FOOD INSECURITY: WITHIN THE PAST 12 MONTHS, THE FOOD YOU BOUGHT JUST DIDN'T LAST AND YOU DIDN'T HAVE MONEY TO GET MORE.: NEVER TRUE

## 2024-11-20 SDOH — ECONOMIC STABILITY: INCOME INSECURITY: HOW HARD IS IT FOR YOU TO PAY FOR THE VERY BASICS LIKE FOOD, HOUSING, MEDICAL CARE, AND HEATING?: SOMEWHAT HARD

## 2024-11-20 SDOH — ECONOMIC STABILITY: FOOD INSECURITY: WITHIN THE PAST 12 MONTHS, YOU WORRIED THAT YOUR FOOD WOULD RUN OUT BEFORE YOU GOT MONEY TO BUY MORE.: SOMETIMES TRUE

## 2024-11-20 NOTE — PROGRESS NOTES
\"Have you been to the ER, urgent care clinic since your last visit?  Hospitalized since your last visit?\"    Yes 09/13/2024    “Have you seen or consulted any other health care providers outside our system since your last visit?”    NO     “Have you had a pap smear?”    NO    No cervical cancer screening on file            
tests/labs or with an appointment plan for any referrals within 1 week(s). The patient is to call if her condition worsens or fails to improve or if significant side effects are experienced.     Please note that this dictation was completed with [employer], the computer voice recognition software. Quite often unanticipated grammatical, syntax, homophones, and other interpretive errors are inadvertently transcribed by the computer software. Please disregard these errors. Please excuse any errors that have escaped final proofreading.    An electronic signature was used to authenticate this note.          --ALE Busch - NP on 11/20/2024 at 2:39 PM

## 2024-11-22 LAB
BACTERIA SPEC CULT: NORMAL
SERVICE CMNT-IMP: NORMAL

## 2024-11-22 NOTE — PATIENT INSTRUCTIONS
[facility] Food Resources*  (Call United Way/211 if need more resources.)      [facility] and the [facility]  What they offer: Assistance with finding a food pantry, soup kitchen or resource near you.  Website: https://[employer].org/get-help/community-resources/  Provide instructions for assistance with SNAP (Food Cerulean) application on this site.     SNAP (formerly Food Cerulean)  What they offer: SNAP is used like cash to buy eligible food items from authorized retailers.  Apply for benefits online: https://www.Metap.[place].gov/  Apply for benefits by telephone: 831.381.3437  Apply for benefits at local Department of      Meals on Wheels   What they offer: Meals on Wheels is a program that delivers meals to individuals age 60+ at home who are unable to purchase or prepare their own meals.   Website: https://[employer].org/how-we-help/meals-on-wheels/  Requirements can vary by program and areas served.   To apply:  Contact [facility]: [phone] (Mon -Fri 8:30 a.m. to 4:30 p.m.)  Coverage Area:  [facility], [facility] & [facility]  Website: [email or web address]  Contact [facility] On [phone] (Mon - Fri 8:00am to 5:30pm)  Coverage Areas: [facility], [facility].    [facility]  What they offer:  [facility] provides comprehensive services to the disadvantaged/low-income community, homebound seniors and homeless in [place], [facility], and [facility].  Phone Number: [phone]  [address] [place] [address]  Services:  Food pantry (Monday, Wednesday, Friday), Soup kitchen, Senior Grocery Delivery Program, Food Bank, hygiene essentials, aid with completing SNAP applications.   Website:

## 2024-11-24 LAB
BACTERIA SPEC CULT: ABNORMAL
BACTERIA SPEC CULT: ABNORMAL
GRAM STN SPEC: ABNORMAL
GRAM STN SPEC: ABNORMAL
SERVICE CMNT-IMP: ABNORMAL

## 2024-11-25 ENCOUNTER — TELEPHONE (OUTPATIENT)
Facility: CLINIC | Age: 24
End: 2024-11-25

## 2024-11-25 NOTE — TELEPHONE ENCOUNTER
Attempted to call patient to inform her culture returned. Wanted to see how are you doing on the Keflex? How does the wound look today? Wasnt able to leave a voicemail

## 2024-12-17 ENCOUNTER — OFFICE VISIT (OUTPATIENT)
Facility: CLINIC | Age: 24
End: 2024-12-17
Payer: OTHER GOVERNMENT

## 2024-12-17 ENCOUNTER — HOSPITAL ENCOUNTER (OUTPATIENT)
Facility: HOSPITAL | Age: 24
Setting detail: SPECIMEN
Discharge: HOME | End: 2024-12-20
Payer: OTHER GOVERNMENT

## 2024-12-17 VITALS
DIASTOLIC BLOOD PRESSURE: 75 MMHG | BODY MASS INDEX: 34.71 KG/M2 | HEART RATE: 105 BPM | HEIGHT: 68 IN | WEIGHT: 229 LBS | OXYGEN SATURATION: 99 % | TEMPERATURE: 97.4 F | SYSTOLIC BLOOD PRESSURE: 106 MMHG

## 2024-12-17 DIAGNOSIS — N89.8 VAGINAL ODOR: ICD-10-CM

## 2024-12-17 DIAGNOSIS — N89.8 VAGINAL ODOR: Primary | ICD-10-CM

## 2024-12-17 LAB
BILIRUBIN, URINE, POC: NEGATIVE
BLOOD URINE, POC: NORMAL
GLUCOSE URINE, POC: NEGATIVE
KETONES, URINE, POC: NEGATIVE
LEUKOCYTE ESTERASE, URINE, POC: NEGATIVE
NITRITE, URINE, POC: NEGATIVE
PH, URINE, POC: 6 (ref 4.6–8)
PROTEIN,URINE, POC: NEGATIVE
SPECIFIC GRAVITY, URINE, POC: 1.03 (ref 1–1.03)
URINALYSIS CLARITY, POC: CLEAR
URINALYSIS COLOR, POC: NORMAL
UROBILINOGEN, POC: NORMAL MG/DL

## 2024-12-17 PROCEDURE — 99213 OFFICE O/P EST LOW 20 MIN: CPT | Performed by: NURSE PRACTITIONER

## 2024-12-17 PROCEDURE — 87563 M. GENITALIUM AMP PROBE: CPT

## 2024-12-17 PROCEDURE — 87591 N.GONORRHOEAE DNA AMP PROB: CPT

## 2024-12-17 PROCEDURE — 81001 URINALYSIS AUTO W/SCOPE: CPT | Performed by: NURSE PRACTITIONER

## 2024-12-17 PROCEDURE — 87491 CHLMYD TRACH DNA AMP PROBE: CPT

## 2024-12-17 PROCEDURE — 87798 DETECT AGENT NOS DNA AMP: CPT

## 2024-12-17 PROCEDURE — 87801 DETECT AGNT MULT DNA AMPLI: CPT

## 2024-12-17 PROCEDURE — 87661 TRICHOMONAS VAGINALIS AMPLIF: CPT

## 2024-12-17 NOTE — PROGRESS NOTES
Pulse (!) 105   Temp 97.4 °F (36.3 °C)   Ht 1.715 m (5' 7.5\")   Wt 103.9 kg (229 lb)   LMP 09/18/2024   SpO2 99%   BMI 35.34 kg/m²   Estimated body mass index is 35.34 kg/m² as calculated from the following:    Height as of this encounter: 1.715 m (5' 7.5\").    Weight as of this encounter: 103.9 kg (229 lb).  Past Medical History:   Diagnosis Date    ADHD (attention deficit hyperactivity disorder)     Anxiety     Depression     History of PCOS     Obesity      Past Surgical History:   Procedure Laterality Date    COSMETIC SURGERY      Breast reduction for back pain    UPPER GASTROINTESTINAL ENDOSCOPY      WISDOM TOOTH EXTRACTION  09/25/2023     Family History   Problem Relation Age of Onset    Depression Mother     High Blood Pressure Mother     Mental Illness Mother     Miscarriages / Stillbirths Mother     Obesity Mother     Substance Abuse Mother     Alcohol Abuse Father     Substance Abuse Maternal Grandfather     Asthma Maternal Grandmother     High Blood Pressure Maternal Grandmother     Psoriasis Maternal Grandmother     Substance Abuse Maternal Grandmother     Alcohol Abuse Paternal Grandfather     Obesity Sister     Substance Abuse Maternal Uncle     Substance Abuse Maternal Uncle     Vision Loss Brother         Optic Nerve Hypoplasia/ Nystagmus       Lab Results   Component Value Date    WBC 7.1 07/10/2024    HGB 10.6 (L) 07/10/2024    HCT 34.0 (L) 07/10/2024    MCV 72.6 (L) 07/10/2024     (H) 07/10/2024    LYMPHOPCT 29 07/10/2024    RBC 4.68 07/10/2024    MCH 22.6 (L) 07/10/2024    MCHC 31.2 07/10/2024    RDW 15.5 (H) 07/10/2024     Lab Results   Component Value Date     02/06/2024    K 4.0 02/06/2024     02/06/2024    CO2 29 02/06/2024    BUN 13 02/06/2024    CREATININE 0.70 02/06/2024    GLUCOSE 92 02/06/2024    CALCIUM 9.6 02/06/2024    BILITOT 0.4 02/06/2024    ALKPHOS 67 02/06/2024    AST 19 02/06/2024    ALT 29 02/06/2024    LABGLOM >60 02/06/2024    GLOB 3.8 02/06/2024

## 2024-12-21 LAB
A VAGINAE DNA VAG QL NAA+PROBE: NORMAL SCORE
BVAB2 DNA VAG QL NAA+PROBE: NORMAL SCORE
C ALBICANS DNA VAG QL NAA+PROBE: NEGATIVE
C GLABRATA DNA VAG QL NAA+PROBE: NEGATIVE
C TRACH RRNA SPEC QL NAA+PROBE: NEGATIVE
M GENITALIUM DNA SPEC QL NAA+PROBE: NEGATIVE
M HOMINIS DNA SPEC QL NAA+PROBE: NEGATIVE
MEGA1 DNA VAG QL NAA+PROBE: NORMAL SCORE
N GONORRHOEA RRNA SPEC QL NAA+PROBE: NEGATIVE
SPECIMEN SOURCE: NORMAL
T VAGINALIS RRNA SPEC QL NAA+PROBE: NEGATIVE
UREAPLASMA DNA SPEC QL NAA+PROBE: NEGATIVE

## 2025-06-17 ENCOUNTER — PATIENT MESSAGE (OUTPATIENT)
Facility: CLINIC | Age: 25
End: 2025-06-17

## 2025-06-18 ENCOUNTER — OFFICE VISIT (OUTPATIENT)
Facility: CLINIC | Age: 25
End: 2025-06-18
Payer: OTHER GOVERNMENT

## 2025-06-18 ENCOUNTER — HOSPITAL ENCOUNTER (OUTPATIENT)
Facility: HOSPITAL | Age: 25
Setting detail: SPECIMEN
Discharge: HOME | End: 2025-06-21
Payer: OTHER GOVERNMENT

## 2025-06-18 VITALS
BODY MASS INDEX: 45.99 KG/M2 | TEMPERATURE: 98.6 F | DIASTOLIC BLOOD PRESSURE: 76 MMHG | HEIGHT: 67 IN | SYSTOLIC BLOOD PRESSURE: 120 MMHG | WEIGHT: 293 LBS | OXYGEN SATURATION: 99 % | HEART RATE: 101 BPM

## 2025-06-18 DIAGNOSIS — Z72.51 UNPROTECTED SEX: ICD-10-CM

## 2025-06-18 DIAGNOSIS — Z00.00 ANNUAL PHYSICAL EXAM: ICD-10-CM

## 2025-06-18 DIAGNOSIS — Z00.00 ANNUAL PHYSICAL EXAM: Primary | ICD-10-CM

## 2025-06-18 DIAGNOSIS — Z23 IMMUNIZATION DUE: ICD-10-CM

## 2025-06-18 PROBLEM — F41.9 ANXIETY: Status: ACTIVE | Noted: 2025-06-18

## 2025-06-18 PROBLEM — D64.9 ANEMIA: Status: ACTIVE | Noted: 2025-06-18

## 2025-06-18 LAB
ALBUMIN SERPL-MCNC: 3.4 G/DL (ref 3.4–5)
ALBUMIN/GLOB SERPL: 0.9 (ref 0.8–1.7)
ALP SERPL-CCNC: 61 U/L (ref 45–117)
ALT SERPL-CCNC: 24 U/L (ref 10–35)
ANION GAP SERPL CALC-SCNC: 12 MMOL/L (ref 3–18)
APPEARANCE UR: CLEAR
AST SERPL-CCNC: 20 U/L (ref 10–38)
BACTERIA URNS QL MICRO: ABNORMAL /HPF
BASOPHILS # BLD: 0.03 K/UL (ref 0–0.1)
BASOPHILS NFR BLD: 0.4 % (ref 0–2)
BILIRUB SERPL-MCNC: 0.3 MG/DL (ref 0.2–1)
BILIRUB UR QL: NEGATIVE
BUN SERPL-MCNC: 11 MG/DL (ref 6–23)
BUN/CREAT SERPL: 15 (ref 12–20)
CALCIUM SERPL-MCNC: 9.5 MG/DL (ref 8.5–10.1)
CHLORIDE SERPL-SCNC: 101 MMOL/L (ref 98–107)
CO2 SERPL-SCNC: 26 MMOL/L (ref 21–32)
COLOR UR: YELLOW
CREAT SERPL-MCNC: 0.71 MG/DL (ref 0.6–1.3)
DIFFERENTIAL METHOD BLD: ABNORMAL
EOSINOPHIL # BLD: 0.05 K/UL (ref 0–0.4)
EOSINOPHIL NFR BLD: 0.6 % (ref 0–5)
EPITH CASTS URNS QL MICRO: ABNORMAL /LPF (ref 0–5)
ERYTHROCYTE [DISTWIDTH] IN BLOOD BY AUTOMATED COUNT: 16.3 % (ref 11.6–14.5)
GLOBULIN SER CALC-MCNC: 3.7 G/DL (ref 2–4)
GLUCOSE SERPL-MCNC: 123 MG/DL (ref 74–108)
GLUCOSE UR STRIP.AUTO-MCNC: NEGATIVE MG/DL
HCG SERPL-ACNC: <1 MIU/ML (ref 0–10)
HCT VFR BLD AUTO: 36.9 % (ref 35–45)
HGB BLD-MCNC: 11.1 G/DL (ref 12–16)
HGB UR QL STRIP: ABNORMAL
IMM GRANULOCYTES # BLD AUTO: 0.02 K/UL (ref 0–0.04)
IMM GRANULOCYTES NFR BLD AUTO: 0.2 % (ref 0–0.5)
KETONES UR QL STRIP.AUTO: NEGATIVE MG/DL
LEUKOCYTE ESTERASE UR QL STRIP.AUTO: NEGATIVE
LYMPHOCYTES # BLD: 2.57 K/UL (ref 0.9–3.6)
LYMPHOCYTES NFR BLD: 30.6 % (ref 21–52)
MCH RBC QN AUTO: 21.3 PG (ref 24–34)
MCHC RBC AUTO-ENTMCNC: 30.1 G/DL (ref 31–37)
MCV RBC AUTO: 71 FL (ref 78–100)
MONOCYTES # BLD: 0.42 K/UL (ref 0.05–1.2)
MONOCYTES NFR BLD: 5 % (ref 3–10)
NEUTS SEG # BLD: 5.3 K/UL (ref 1.8–8)
NEUTS SEG NFR BLD: 63.2 % (ref 40–73)
NITRITE UR QL STRIP.AUTO: NEGATIVE
NRBC # BLD: 0 K/UL (ref 0–0.01)
NRBC BLD-RTO: 0 PER 100 WBC
PH UR STRIP: 5.5 (ref 5–8)
PLATELET # BLD AUTO: 414 K/UL (ref 135–420)
PMV BLD AUTO: 9 FL (ref 9.2–11.8)
POTASSIUM SERPL-SCNC: 4.4 MMOL/L (ref 3.5–5.5)
PROT SERPL-MCNC: 7.1 G/DL (ref 6.4–8.2)
PROT UR STRIP-MCNC: NEGATIVE MG/DL
RBC # BLD AUTO: 5.2 M/UL (ref 4.2–5.3)
RBC #/AREA URNS HPF: ABNORMAL /HPF (ref 0–5)
SODIUM SERPL-SCNC: 139 MMOL/L (ref 136–145)
SP GR UR REFRACTOMETRY: 1.02 (ref 1–1.03)
UROBILINOGEN UR QL STRIP.AUTO: 0.2 EU/DL (ref 0.2–1)
WBC # BLD AUTO: 8.4 K/UL (ref 4.6–13.2)
WBC URNS QL MICRO: ABNORMAL /HPF (ref 0–5)

## 2025-06-18 PROCEDURE — 85025 COMPLETE CBC W/AUTO DIFF WBC: CPT

## 2025-06-18 PROCEDURE — 36415 COLL VENOUS BLD VENIPUNCTURE: CPT

## 2025-06-18 PROCEDURE — 81001 URINALYSIS AUTO W/SCOPE: CPT

## 2025-06-18 PROCEDURE — 80053 COMPREHEN METABOLIC PANEL: CPT

## 2025-06-18 PROCEDURE — 99395 PREV VISIT EST AGE 18-39: CPT | Performed by: NURSE PRACTITIONER

## 2025-06-18 PROCEDURE — 84702 CHORIONIC GONADOTROPIN TEST: CPT

## 2025-06-18 PROCEDURE — 99213 OFFICE O/P EST LOW 20 MIN: CPT | Performed by: NURSE PRACTITIONER

## 2025-06-18 SDOH — ECONOMIC STABILITY: FOOD INSECURITY: WITHIN THE PAST 12 MONTHS, THE FOOD YOU BOUGHT JUST DIDN'T LAST AND YOU DIDN'T HAVE MONEY TO GET MORE.: NEVER TRUE

## 2025-06-18 SDOH — ECONOMIC STABILITY: FOOD INSECURITY: WITHIN THE PAST 12 MONTHS, YOU WORRIED THAT YOUR FOOD WOULD RUN OUT BEFORE YOU GOT MONEY TO BUY MORE.: NEVER TRUE

## 2025-06-18 ASSESSMENT — PATIENT HEALTH QUESTIONNAIRE - PHQ9
1. LITTLE INTEREST OR PLEASURE IN DOING THINGS: NOT AT ALL
2. FEELING DOWN, DEPRESSED OR HOPELESS: NOT AT ALL
SUM OF ALL RESPONSES TO PHQ QUESTIONS 1-9: 0

## 2025-06-18 NOTE — PROGRESS NOTES
Niurka Padgett (:  2000) is a 25 y.o. female, here for evaluation of the following medical concerns:  Chief Complaint   Patient presents with    Annual Exam         ASSESSMENT/PLAN:    Assessment & Plan  1. Polycystic Ovary Syndrome (PCOS).  - Long-standing history of PCOS with spotting only during ovulation.  - Currently taking prenatal vitamins and an additional 24 mg of iron daily.  - Quantitative serum test ordered to confirm or rule out pregnancy.    2. Suspected Pregnancy.  - Reports feeling different and unusual spotting, which typically only occurs during ovulation.  - Quantitative serum test ordered to confirm or rule out pregnancy.  - Follow-up with GYN scheduled for 2025.    3. Increased Bowel Movements.  - Reports increased bowel movements, about once a day for the past week.  - No changes in diet or stomach issues noted.  - Advised that two to three bowel movements a day is better, but at least once a day is still good.    4. General Health Maintenance.  - Advised to schedule eye and dental check-ups.  - MRI checked and results reviewed.  - Quit vaping 3 months ago.  - Referral to oncology for follow-up.  Annual physical exam  -     CBC with Auto Differential; Future  -     Comprehensive Metabolic Panel; Future  -     Urinalysis with Microscopic; Future  Immunization due  Unprotected sex  -     HCG, Quantitative, Pregnancy; Future    Results             History of Present Illness  The patient presents for evaluation of PCOS and suspected pregnancy.    She has a long-standing history of Polycystic Ovary Syndrome (PCOS) and typically does not experience spotting outside of her menstrual cycle. She reports that she only experiences spotting during ovulation, which occurred on 2025. She also mentions unprotected sexual intercourse around this time. Although she believes it may be too early to confirm, she suspects a potential pregnancy due to a perceived change in her physical state. Her

## 2025-06-18 NOTE — PROGRESS NOTES
Have you been to the ER, urgent care clinic since your last visit?  Hospitalized since your last visit?   NO    Have you seen or consulted any other health care providers outside our system since your last visit?   NO     “Have you had a pap smear?”    [facility] 7/1    No cervical cancer screening on file

## 2025-06-23 ENCOUNTER — RESULTS FOLLOW-UP (OUTPATIENT)
Facility: CLINIC | Age: 25
End: 2025-06-23

## 2025-06-27 ENCOUNTER — TELEMEDICINE (OUTPATIENT)
Facility: CLINIC | Age: 25
End: 2025-06-27

## 2025-06-27 DIAGNOSIS — Z71.2 ENCOUNTER TO DISCUSS TEST RESULTS: Primary | ICD-10-CM

## 2025-06-27 ASSESSMENT — ENCOUNTER SYMPTOMS
SHORTNESS OF BREATH: 0
COUGH: 0
CHEST TIGHTNESS: 0
ABDOMINAL PAIN: 0
VOMITING: 0
NAUSEA: 0

## 2025-06-27 NOTE — PROGRESS NOTES
Niurka Padgett, was evaluated through a synchronous (real-time) audio-video encounter. The patient (or guardian if applicable) is aware that this is a billable service, which includes applicable co-pays. This Virtual Visit was conducted with patient's (and/or legal guardian's) consent. Patient identification was verified, and a caregiver was present when appropriate.   The patient was located at Home: [address] [facility] [id number]  Provider was located at Facility (Appt Dept): [address] , [facility] [address]  [place] [address]  Confirm you are appropriately licensed, registered, or certified to deliver care in the state where the patient is located as indicated above. If you are not or unsure, please re-schedule the visit: Yes, I confirm.     Niurka Padgett (:  2000) is a Established patient, presenting virtually for evaluation of the following:      Below is the assessment and plan developed based on review of pertinent history, physical exam, labs, studies, and medications.     Assessment & Plan  Encounter to discuss test results              Assessment & Plan  1. Anemia.  Her hemoglobin level is currently at 11, indicating a persistent issue with anemia. Despite this, her overall health status is satisfactory. She is advised to continue her current regimen of prenatal vitamins and daily iron supplements (325 mg or 65 mg elemental iron). She is scheduled to consult with a gynecologist on Tuesday, 2025, to investigate the cause of her intermittent spotting and elevated pulse rate, which could be related to irregular menstrual cycles or potential fibroids. The gynecologist may recommend an increase in her iron intake based on their findings. If the gynecologist identifies a gynecological cause for her symptoms, they will take over her management. She should return for a follow-up consultation if necessary.  Encounter to discuss test results    Results  Laboratory Studies  Hemoglobin was at 
not applicable (Male)